# Patient Record
Sex: MALE | Race: WHITE | NOT HISPANIC OR LATINO | Employment: FULL TIME | URBAN - METROPOLITAN AREA
[De-identification: names, ages, dates, MRNs, and addresses within clinical notes are randomized per-mention and may not be internally consistent; named-entity substitution may affect disease eponyms.]

---

## 2019-05-24 ENCOUNTER — HOSPITAL ENCOUNTER (EMERGENCY)
Facility: HOSPITAL | Age: 29
Discharge: HOME | End: 2019-05-26
Attending: EMERGENCY MEDICINE
Payer: COMMERCIAL

## 2019-05-24 DIAGNOSIS — R45.6 VIOLENT BEHAVIOR: ICD-10-CM

## 2019-05-24 DIAGNOSIS — Z00.8 MEDICAL CLEARANCE FOR PSYCHIATRIC ADMISSION: ICD-10-CM

## 2019-05-24 DIAGNOSIS — F10.920 ALCOHOLIC INTOXICATION WITHOUT COMPLICATION: Primary | ICD-10-CM

## 2019-05-24 PROCEDURE — 84443 ASSAY THYROID STIM HORMONE: CPT

## 2019-05-24 PROCEDURE — 80329 ANALGESICS NON-OPIOID 1 OR 2: CPT

## 2019-05-24 PROCEDURE — 96375 TX/PRO/DX INJ NEW DRUG ADDON: CPT

## 2019-05-24 PROCEDURE — 99285 EMERGENCY DEPT VISIT HI MDM: CPT | Mod: 25

## 2019-05-24 PROCEDURE — 80320 DRUG SCREEN QUANTALCOHOLS: CPT

## 2019-05-24 PROCEDURE — 80053 COMPREHEN METABOLIC PANEL: CPT

## 2019-05-24 PROCEDURE — 96372 THER/PROPH/DIAG INJ SC/IM: CPT

## 2019-05-24 PROCEDURE — 25000003 PHARM REV CODE 250: Performed by: EMERGENCY MEDICINE

## 2019-05-24 PROCEDURE — 96361 HYDRATE IV INFUSION ADD-ON: CPT

## 2019-05-24 PROCEDURE — 80307 DRUG TEST PRSMV CHEM ANLYZR: CPT

## 2019-05-24 PROCEDURE — 85025 COMPLETE CBC W/AUTO DIFF WBC: CPT

## 2019-05-24 RX ORDER — HALOPERIDOL 5 MG/ML
5 INJECTION INTRAMUSCULAR ONCE
Status: COMPLETED | OUTPATIENT
Start: 2019-05-25 | End: 2019-05-25

## 2019-05-24 RX ORDER — OLANZAPINE 10 MG/2ML
10 INJECTION, POWDER, FOR SOLUTION INTRAMUSCULAR ONCE AS NEEDED
Status: COMPLETED | OUTPATIENT
Start: 2019-05-25 | End: 2019-05-24

## 2019-05-24 RX ADMIN — SODIUM CHLORIDE, SODIUM LACTATE, POTASSIUM CHLORIDE, AND CALCIUM CHLORIDE 1000 ML: .6; .31; .03; .02 INJECTION, SOLUTION INTRAVENOUS at 11:05

## 2019-05-24 RX ADMIN — OLANZAPINE 10 MG: 10 INJECTION, POWDER, FOR SOLUTION INTRAMUSCULAR at 11:05

## 2019-05-25 LAB
ALBUMIN SERPL BCP-MCNC: 4.4 G/DL (ref 3.5–5.2)
ALP SERPL-CCNC: 97 U/L (ref 55–135)
ALT SERPL W/O P-5'-P-CCNC: 22 U/L (ref 10–44)
AMPHET+METHAMPHET UR QL: NEGATIVE
ANION GAP SERPL CALC-SCNC: 13 MMOL/L (ref 8–16)
ANISOCYTOSIS BLD QL SMEAR: SLIGHT
APAP SERPL-MCNC: <3 UG/ML (ref 10–20)
AST SERPL-CCNC: 20 U/L (ref 10–40)
BARBITURATES UR QL SCN>200 NG/ML: NEGATIVE
BASOPHILS # BLD AUTO: 0.05 K/UL (ref 0–0.2)
BASOPHILS NFR BLD: 0.3 % (ref 0–1.9)
BENZODIAZ UR QL SCN>200 NG/ML: NEGATIVE
BILIRUB SERPL-MCNC: 0.2 MG/DL (ref 0.1–1)
BILIRUB UR QL STRIP: NEGATIVE
BUN SERPL-MCNC: 14 MG/DL (ref 6–20)
BZE UR QL SCN: NEGATIVE
CALCIUM SERPL-MCNC: 9 MG/DL (ref 8.7–10.5)
CANNABINOIDS UR QL SCN: NEGATIVE
CHLORIDE SERPL-SCNC: 113 MMOL/L (ref 95–110)
CLARITY UR: CLEAR
CO2 SERPL-SCNC: 17 MMOL/L (ref 23–29)
COLOR UR: YELLOW
CREAT SERPL-MCNC: 1.2 MG/DL (ref 0.5–1.4)
CREAT UR-MCNC: 35.1 MG/DL (ref 23–375)
DACRYOCYTES BLD QL SMEAR: ABNORMAL
DIFFERENTIAL METHOD: ABNORMAL
EOSINOPHIL # BLD AUTO: 0.1 K/UL (ref 0–0.5)
EOSINOPHIL NFR BLD: 0.4 % (ref 0–8)
ERYTHROCYTE [DISTWIDTH] IN BLOOD BY AUTOMATED COUNT: 13.8 % (ref 11.5–14.5)
EST. GFR  (AFRICAN AMERICAN): >60 ML/MIN/1.73 M^2
EST. GFR  (NON AFRICAN AMERICAN): >60 ML/MIN/1.73 M^2
ETHANOL SERPL-MCNC: 145 MG/DL
ETHANOL SERPL-MCNC: 206 MG/DL
ETHANOL SERPL-MCNC: 353 MG/DL
ETHANOL SERPL-MCNC: 94 MG/DL
GLUCOSE SERPL-MCNC: 103 MG/DL (ref 70–110)
GLUCOSE UR QL STRIP: NEGATIVE
HCT VFR BLD AUTO: 46 % (ref 40–54)
HGB BLD-MCNC: 15.9 G/DL (ref 14–18)
HGB UR QL STRIP: NEGATIVE
KETONES UR QL STRIP: NEGATIVE
LEUKOCYTE ESTERASE UR QL STRIP: NEGATIVE
LYMPHOCYTES # BLD AUTO: 5.3 K/UL (ref 1–4.8)
LYMPHOCYTES NFR BLD: 31.7 % (ref 18–48)
MCH RBC QN AUTO: 32.6 PG (ref 27–31)
MCHC RBC AUTO-ENTMCNC: 34.6 G/DL (ref 32–36)
MCV RBC AUTO: 95 FL (ref 82–98)
METHADONE UR QL SCN>300 NG/ML: NEGATIVE
MONOCYTES # BLD AUTO: 0.9 K/UL (ref 0.3–1)
MONOCYTES NFR BLD: 5.4 % (ref 4–15)
NEUTROPHILS # BLD AUTO: 10.3 K/UL (ref 1.8–7.7)
NEUTROPHILS NFR BLD: 62.6 % (ref 38–73)
NITRITE UR QL STRIP: NEGATIVE
OPIATES UR QL SCN: NEGATIVE
PCP UR QL SCN>25 NG/ML: NEGATIVE
PH UR STRIP: 6 [PH] (ref 5–8)
PLATELET # BLD AUTO: 369 K/UL (ref 150–350)
PMV BLD AUTO: 11.7 FL (ref 9.2–12.9)
POIKILOCYTOSIS BLD QL SMEAR: SLIGHT
POLYCHROMASIA BLD QL SMEAR: ABNORMAL
POTASSIUM SERPL-SCNC: 3.8 MMOL/L (ref 3.5–5.1)
PROT SERPL-MCNC: 7.5 G/DL (ref 6–8.4)
PROT UR QL STRIP: NEGATIVE
RBC # BLD AUTO: 4.87 M/UL (ref 4.6–6.2)
SALICYLATES SERPL-MCNC: <5 MG/DL (ref 15–30)
SODIUM SERPL-SCNC: 143 MMOL/L (ref 136–145)
SP GR UR STRIP: <=1.005 (ref 1–1.03)
SPHEROCYTES BLD QL SMEAR: ABNORMAL
TOXICOLOGY INFORMATION: NORMAL
TSH SERPL DL<=0.005 MIU/L-ACNC: 0.8 UIU/ML (ref 0.4–4)
URN SPEC COLLECT METH UR: ABNORMAL
UROBILINOGEN UR STRIP-ACNC: NEGATIVE EU/DL
WBC # BLD AUTO: 16.61 K/UL (ref 3.9–12.7)

## 2019-05-25 PROCEDURE — 93010 EKG 12-LEAD: ICD-10-PCS | Mod: ,,, | Performed by: INTERNAL MEDICINE

## 2019-05-25 PROCEDURE — 63600175 PHARM REV CODE 636 W HCPCS: Performed by: EMERGENCY MEDICINE

## 2019-05-25 PROCEDURE — 25000003 PHARM REV CODE 250: Performed by: EMERGENCY MEDICINE

## 2019-05-25 PROCEDURE — 96365 THER/PROPH/DIAG IV INF INIT: CPT

## 2019-05-25 PROCEDURE — 81003 URINALYSIS AUTO W/O SCOPE: CPT | Mod: 59

## 2019-05-25 PROCEDURE — 93005 ELECTROCARDIOGRAM TRACING: CPT

## 2019-05-25 PROCEDURE — 80307 DRUG TEST PRSMV CHEM ANLYZR: CPT

## 2019-05-25 PROCEDURE — 96367 TX/PROPH/DG ADDL SEQ IV INF: CPT

## 2019-05-25 PROCEDURE — 80320 DRUG SCREEN QUANTALCOHOLS: CPT

## 2019-05-25 PROCEDURE — S0166 INJ OLANZAPINE 2.5MG: HCPCS | Performed by: EMERGENCY MEDICINE

## 2019-05-25 PROCEDURE — 93010 ELECTROCARDIOGRAM REPORT: CPT | Mod: ,,, | Performed by: INTERNAL MEDICINE

## 2019-05-25 RX ADMIN — HALOPERIDOL LACTATE 5 MG: 5 INJECTION, SOLUTION INTRAMUSCULAR at 12:05

## 2019-05-25 RX ADMIN — FOLIC ACID 1 MG: 5 INJECTION, SOLUTION INTRAMUSCULAR; INTRAVENOUS; SUBCUTANEOUS at 02:05

## 2019-05-25 RX ADMIN — THIAMINE HYDROCHLORIDE 100 MG: 100 INJECTION, SOLUTION INTRAMUSCULAR; INTRAVENOUS at 12:05

## 2019-05-25 RX ADMIN — LORAZEPAM 2 MG: 2 INJECTION INTRAMUSCULAR; INTRAVENOUS at 12:05

## 2019-05-25 RX ADMIN — SODIUM CHLORIDE, SODIUM LACTATE, POTASSIUM CHLORIDE, AND CALCIUM CHLORIDE 1000 ML: .6; .31; .03; .02 INJECTION, SOLUTION INTRAVENOUS at 12:05

## 2019-05-25 NOTE — ED NOTES
Pt resting quietly in bed, eyes closed, respirations even non-labored. Kalani ERT, sitting for direct observation.

## 2019-05-25 NOTE — ED NOTES
Pt lying quietly in bed, easily aroused, denies any complaints, Kalani ERT, sitting for direct observation.

## 2019-05-25 NOTE — ED NOTES
No response for acceptance from previous facilities, however I am proceeding to the following facilities for placement:  SHAN Brink  [facility]

## 2019-05-25 NOTE — ED NOTES
Packet being faxed out to all [facility] facilities for possible placement

## 2019-05-25 NOTE — ED NOTES
Pt resting quietly, appears in no distress, respirations even non-labored. Kalani OSCAR, sitter at bedside for direct observation.

## 2019-05-25 NOTE — ED NOTES
Received no acceptance response from [facility] facilities at this time I am proceeding to fax packet out to following [facility] Facilities:  [facility]
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
## 2019-05-25 NOTE — ED NOTES
Patient belongings: in radiology locker # 2     shorts, with belt  1 pair of black tennis shoes  1 black baseball cap  Black and white shirt    Wallet with various cards and license

## 2019-05-25 NOTE — ED NOTES
Pt found lying quietly in bed, aroused with verbal stimuli. Pt sat up and stated he was ready to go. Advised pt on PEC protocol and pending transfer to psych facility. Pt stated he was not going anywhere, that he needed to go back to work. Advised pt again that he could not leave. Pt sat up in bed and said he was not staying. Security was called to bedside to talk to pt. Pt laid back down in bed when security arrived.

## 2019-05-25 NOTE — ED NOTES
Pt sitting up in a chair while Kalani draws blood for repeat ethanol level. Pt denies any complaints, but asking to use the phone.

## 2019-05-26 VITALS
HEIGHT: 72 IN | RESPIRATION RATE: 18 BRPM | DIASTOLIC BLOOD PRESSURE: 86 MMHG | TEMPERATURE: 99 F | SYSTOLIC BLOOD PRESSURE: 154 MMHG | WEIGHT: 190.69 LBS | OXYGEN SATURATION: 100 % | HEART RATE: 76 BPM | BODY MASS INDEX: 25.83 KG/M2

## 2019-05-26 PROCEDURE — G0425 PR INPT TELEHEALTH CONSULT 30M: ICD-10-PCS | Mod: GT,,, | Performed by: PSYCHIATRY & NEUROLOGY

## 2019-05-26 PROCEDURE — G0425 INPT/ED TELECONSULT30: HCPCS | Mod: GT,,, | Performed by: PSYCHIATRY & NEUROLOGY

## 2019-05-26 NOTE — ED NOTES
Report received from Yvonne SCHULTZ. Pt given juice and water. Dinner tray thrown away due to pt eating all of food.

## 2019-05-26 NOTE — ED NOTES
Admit packet faxed to [facility], [facility], [facility], [facility], [facility], [facility], [facility]. Waiting for response.

## 2019-05-26 NOTE — CONSULTS
[facility]  Psychiatry  Telepsychiatry Consult Note    Please see previous notes:    Patient agreeable to consultation via telepsychiatry.    Tele-Consultation from Psychiatry started: 5/26/2019 at 12:39am  The chief complaint leading to psychiatric consultation is: substance use  This consultation was requested by Dr.Nicholas Velasco, the Emergency Department attending physician.  The location of the consulting psychiatrist is [address] [place] [address].  The patient location is  [facility] EMERGENCY DEPARTMENT   The patient arrived at the ED at:yesterday  Also present with the patient at the time of the consultation: none    Patient Identification:   Patient information was obtained from patient and past medical records.  Patient presented involuntarily to the Emergency Department ambulatory.    Consults  Subjective:     History of Present Illness:  Porfirio Lee is a 28 y.o. Male. With past psych h/o substance use d/o and mood d/o presented to the ED after being comabtive at a a restaurant and EMS was called.  On arrival patient was grossly intoxicated with a blood alcohol level of 353, 2 days ago and initially patient continued to be combative and agitated and needed chemical restraint with p.r.n. meds  Today patient is no longer intoxicated and blood alcohol level was less than 100 at 12:30 p.m. in the afternoon.  Patient reports that he is in the hospital because I drank too much patient reports that things are fine actually doing good patient vehemently denies any symptoms of depression anxiety or other this acute psychiatric symptoms.  Patient denies any suicidal thoughts homicidal thoughts auditory visual hallucinations  Patient does report history of alcohol use disorder many years ago that he no longer feels has a problem with because he is a .  Patient reports he rarely drinks now and no longer uses any drugs because he is drug tested patient reports that in the  past he was diagnosed with bipolar disorder but this also coincided with his heavy use of alcohol and illicit substances.  Patient reports being on Seroquel in the past that did not help so he stopped taking it more than a year ago and he no longer has any symptoms.  Patient denies any changes in his sleep for appetite he denies any psychotic symptoms or manic symptoms recently.  Patient reports he is originally from [facility] but since he is a  he travels a lot basically live in my truck    Collateral mother miss that I a.m. [phone] patient gave permission to contact her  Informed ED staff to contact mom to make sure patient is safe to return and his story corroborates    Per ED notes in the last 24 hr since his initial presentation patient's mood has been calm and cooperative most of this presentation he no longer exhibited any agitation or combativeness.    Psychiatric History:   Previous Psychiatric Hospitalizations: No   Previous Medication Trials: Yes Seroquel  Previous Suicide Attempts: no   History of Violence:  Patient denies this  History of Depression:  Denies  History of Jaclyn:  Reports being diagnosed with bipolar however questionable as patient also was drinking heavily and using drugs  History of Auditory/Visual Hallucination denied  History of Delusions:  Denied  Outpatient psychiatrist (current & past): No    Substance Abuse History:  [id number]:Yes  Alcohol: Yes  Illicit Substances:No  Detox/Rehab: No    Legal History: Past charges/incarcerations: Yes     Family Psychiatric History: denied    Social History:  Developmental/Childhood:Achieved all developmental milestones timely  *Education:some college  Employment Status/Finances:Employed   Relationship Status/Sexual Orientation: Single:    Children: 0  Housing Status: Homeless    history:  NO  Access to gun: NO  Protestant:Spiritual without formal affiliation  Recreational activities:hiking    Psychiatric Mental Status  "Exam:  Arousal: alert  Sensorium/Orientation: oriented to grossly intact  Behavior/Cooperation: normal, cooperative   Speech: normal tone, normal rate, normal pitch, normal volume  Language: grossly intact  Mood: "  Fine "   Affect: flat, blunted and Euthymic  Thought Process: normal and logical  Thought Content:   Auditory hallucinations: NO  Visual hallucinations: NO  Paranoia: NO  Delusions:  NO  Suicidal ideation: NO  Homicidal ideation: NO  Attention/Concentration:  intact  Memory:    Recent:  Decreased to to intoxication   Remote: Intact  Fund of Knowledge: Intact   Insight: intact  Judgment: behavior is adequate to circumstances      Past Medical History: No past medical history on file.   Laboratory Data:   Labs Reviewed   CBC W/ AUTO DIFFERENTIAL - Abnormal; Notable for the following components:       Result Value    WBC 16.61 (*)     Mean Corpuscular Hemoglobin 32.6 (*)     Platelets 369 (*)     Gran # (ANC) 10.3 (*)     Lymph # 5.3 (*)     All other components within normal limits   COMPREHENSIVE METABOLIC PANEL - Abnormal; Notable for the following components:    Chloride 113 (*)     CO2 17 (*)     All other components within normal limits   URINALYSIS, REFLEX TO URINE CULTURE - Abnormal; Notable for the following components:    Specific Gravity, UA <=1.005 (*)     All other components within normal limits    Narrative:     Preferred Collection Type->Urine, Clean Catch   ALCOHOL,MEDICAL (ETHANOL) - Abnormal; Notable for the following components:    Alcohol, Medical, Serum 353 (*)     All other components within normal limits    Narrative:     ALC critical result(s) called and verbal readback obtained from   PAPA MASCORRO RN, 05/25/2019 00:36   ACETAMINOPHEN LEVEL - Abnormal; Notable for the following components:    Acetaminophen (Tylenol), Serum <3.0 (*)     All other components within normal limits   SALICYLATE LEVEL - Abnormal; Notable for the following components:    Salicylate Lvl <5.0 (*)     All " other components within normal limits   ALCOHOL,MEDICAL (ETHANOL) - Abnormal; Notable for the following components:    Alcohol, Medical, Serum 206 (*)     All other components within normal limits   ALCOHOL,MEDICAL (ETHANOL) - Abnormal; Notable for the following components:    Alcohol, Medical, Serum 145 (*)     All other components within normal limits   ALCOHOL,MEDICAL (ETHANOL) - Abnormal; Notable for the following components:    Alcohol, Medical, Serum 94 (*)     All other components within normal limits   TSH   DRUG SCREEN PANEL, URINE EMERGENCY    Narrative:     Preferred Collection Type->Urine, Clean Catch       Neurological History:  Seizures: No  Head trauma: No    Allergies:   Review of patient's allergies indicates:  No Known Allergies    Medications in ER:   Medications   OLANZapine injection 10 mg (10 mg Intramuscular Given 5/24/19 2318)   lactated ringers bolus 1,000 mL (0 mLs Intravenous Stopped 5/25/19 0040)   lorazepam (ATIVAN) injection 2 mg (2 mg Intravenous Given 5/25/19 0000)   haloperidol lactate injection 5 mg (5 mg Intravenous Given 5/25/19 0000)   lactated ringers bolus 1,000 mL (0 mLs Intravenous Stopped 5/25/19 0257)   thiamine (B-1) 100 mg in dextrose 5 % 50 mL IVPB (0 mg Intravenous Stopped 5/25/19 0145)   folic acid 1 mg in dextrose 5 % 100 mL IVPB (0 mg Intravenous Stopped 5/25/19 0330)       Medications at home:  None    No new subjective & objective note has been filed under this hospital service since the last note was generated.    Vitals:    05/25/19 0101 05/25/19 0116 05/25/19 0925 05/25/19 1729   BP: (!) 113/55 139/65 (!) 118/44 133/74   BP Location:    Left arm   Patient Position:    Lying   Pulse: 104 106 94 82   Resp: (!) 23 19 18   Temp:   98.7 °F (37.1 °C) 98.7 °F (37.1 °C)   TempSrc:   Oral Oral   SpO2: (!) 92% 98% 98% 99%   Weight:       Height:           Assessment - Diagnosis - Goals:     Diagnosis/Impression:  Alcohol intoxication-resolved  Alcohol Abuse  History of  substance induced mood disorder    Rec:   Dispo-  Once medically cleared patient may be discharged home however since patient is not from [place] concerned patient might not be safe to be discharged since we are not able to corroborate his information would recommend contacting next of kin to get patient's baseline and if he is safe to return him to [place].  However if family is unable to be reached currently may recommend considering discharging patient as he no longer is exhibiting any symptoms that warrant involuntary admission at this juncture and patient not agreeable to staying in the hospital.    Follow-up  Encouraged patient to seek rehab however patient denies having any problems may recommend patient to seek outpatient psychiatric help for further assistance with managing addiction problems    The psych meds-none needed    Rescind PEC because pt is no longer in any imminent danger of hurting self or others and not gravely disabled however if family gives concerning information may need to continue PEC  and send patient to inpatient psych      More than 50% of the time was spent counseling/coordinating care    Consulting clinician was informed of the encounter and consult note.    Consultation ended: 5/26/2019 at 1:07am    Elsa Garrison MD   Psychiatry  [facility]    DISCLAIMER: This note was prepared with [place] voice recognition transcription software. Garbled syntax, mangled pronouns, and other bizarre constructions may be attributed to that software system

## 2019-05-26 NOTE — ED PROVIDER NOTES
Encounter Date: 5/24/2019       History     Chief Complaint   Patient presents with    Drug / Alcohol Assessment     HPI  Review of patient's allergies indicates:  No Known Allergies  No past medical history on file.  No past surgical history on file.  No family history on file.  Social History     Tobacco Use    Smoking status: Not on file   Substance Use Topics    Alcohol use: Not on file    Drug use: Not on file     Review of Systems    Physical Exam     Initial Vitals   BP Pulse Resp Temp SpO2   05/24/19 2317 05/24/19 2313 05/24/19 2317 05/24/19 2313 05/24/19 2321   106/68 (!) 190 (!) 31 98.4 °F (36.9 °C) (!) 91 %      MAP       --                Physical Exam    ED Course   Procedures  Labs Reviewed   CBC W/ AUTO DIFFERENTIAL - Abnormal; Notable for the following components:       Result Value    WBC 16.61 (*)     Mean Corpuscular Hemoglobin 32.6 (*)     Platelets 369 (*)     Gran # (ANC) 10.3 (*)     Lymph # 5.3 (*)     All other components within normal limits   COMPREHENSIVE METABOLIC PANEL - Abnormal; Notable for the following components:    Chloride 113 (*)     CO2 17 (*)     All other components within normal limits   URINALYSIS, REFLEX TO URINE CULTURE - Abnormal; Notable for the following components:    Specific Gravity, UA <=1.005 (*)     All other components within normal limits    Narrative:     Preferred Collection Type->Urine, Clean Catch   ALCOHOL,MEDICAL (ETHANOL) - Abnormal; Notable for the following components:    Alcohol, Medical, Serum 353 (*)     All other components within normal limits    Narrative:     ALC critical result(s) called and verbal readback obtained from   PAPA MASCORRO RN, 05/25/2019 00:36   ACETAMINOPHEN LEVEL - Abnormal; Notable for the following components:    Acetaminophen (Tylenol), Serum <3.0 (*)     All other components within normal limits   SALICYLATE LEVEL - Abnormal; Notable for the following components:    Salicylate Lvl <5.0 (*)     All other components  within normal limits   ALCOHOL,MEDICAL (ETHANOL) - Abnormal; Notable for the following components:    Alcohol, Medical, Serum 206 (*)     All other components within normal limits   ALCOHOL,MEDICAL (ETHANOL) - Abnormal; Notable for the following components:    Alcohol, Medical, Serum 145 (*)     All other components within normal limits   ALCOHOL,MEDICAL (ETHANOL) - Abnormal; Notable for the following components:    Alcohol, Medical, Serum 94 (*)     All other components within normal limits   TSH   DRUG SCREEN PANEL, URINE EMERGENCY    Narrative:     Preferred Collection Type->Urine, Clean Catch        ECG Results          EKG 12-lead (Final result)  Result time 05/25/19 11:36:45    Final result by Interface, Lab [facility] (05/25/19 11:36:45)                 Narrative:    Test Reason : Z00.8,    Vent. Rate : 104 BPM     Atrial Rate : 104 BPM     P-R Int : 144 ms          QRS Dur : 098 ms      QT Int : 352 ms       P-R-T Axes : 048 084 051 degrees     QTc Int : 462 ms    Sinus tachycardia  Otherwise normal ECG  No previous ECGs available  Confirmed by DIA SHAH MD ([id number]) on 5/25/2019 11:36:39 AM    Referred By: [email or web address]   SELF           Confirmed By:DIA SHAH MD                            Imaging Results    None           Pt presented agitated and combative with PEC placed as he was clearly a threat to himself and others.  Pt medically cleared.  PEC has remained in place and will continue to monitor and await transfer to the nearest available inpatient psychiatric facility.    Be Velasco MD    Pt reassessed and was calm and cooperative with no endorsement of SI or HI.  Pt then given a tele Psych consult and the psychiatrist agreed he is safe for discharge.  PEC rescinded.  Pt discharged and counseled on the need to return to the nearest emergency room if they experience any other concerning symptoms.  Pt counseled to F/U outpatient with a PCP over the next two to three days.    Be Velasco   MD  05/26/2019  2:24 AM                          Clinical Impression:       ICD-10-CM ICD-9-CM   1. Alcoholic intoxication without complication F10.920 305.00   2. Medical clearance for psychiatric admission Z00.8 V70.8   3. Violent behavior R45.6 312.00                                Be Velasco MD  05/25/19 1927       Be Velasco MD  05/26/19 0224

## 2019-05-26 NOTE — ED NOTES
Admit packet faxed to [facility], [facility], [facility], [facility], [facility], [facility], [facility],and [facility].  Waiting for response. Admit packet faxed to all adult facilities.

## 2019-05-26 NOTE — ED NOTES
Admit packet faxed to [facility], [facility], [facility], [facility], [facility], [facility]. Waiting for response.

## 2019-05-26 NOTE — ED NOTES
Awaiting acceptance. Pt is resting in bed with eyes closed. NAD noted. RR e/u, airway open and patent. Will continue to monitor.

## 2019-05-26 NOTE — ED NOTES
Admit packet re faxed to [facility], [facility], [facility], [facility], [facility], [facility], [facility], [facility], [facility], [facility] with updated information. Waiting for response.

## 2019-05-26 NOTE — ED NOTES
Pt lying quietly in bed, awake, watching TV, denies any complaints. Rasheed OSCAR at bedside for direct observation.